# Patient Record
Sex: FEMALE | Race: ASIAN | URBAN - METROPOLITAN AREA
[De-identification: names, ages, dates, MRNs, and addresses within clinical notes are randomized per-mention and may not be internally consistent; named-entity substitution may affect disease eponyms.]

---

## 2023-11-22 ENCOUNTER — OFFICE VISIT (OUTPATIENT)
Dept: URGENT CARE | Facility: CLINIC | Age: 23
End: 2023-11-22
Payer: MEDICAID

## 2023-11-22 VITALS
SYSTOLIC BLOOD PRESSURE: 124 MMHG | OXYGEN SATURATION: 99 % | TEMPERATURE: 98 F | DIASTOLIC BLOOD PRESSURE: 88 MMHG | WEIGHT: 116 LBS | HEART RATE: 74 BPM

## 2023-11-22 DIAGNOSIS — J22 LOWER RESPIRATORY INFECTION (E.G., BRONCHITIS, PNEUMONIA, PNEUMONITIS, PULMONITIS): Primary | ICD-10-CM

## 2023-11-22 DIAGNOSIS — H66.91 ACUTE OTITIS MEDIA, RIGHT: ICD-10-CM

## 2023-11-22 PROBLEM — R51.9 INCREASED FREQUENCY OF HEADACHES: Status: ACTIVE | Noted: 2019-08-16

## 2023-11-22 PROBLEM — F41.9 ANXIETY: Status: ACTIVE | Noted: 2020-08-03

## 2023-11-22 PROBLEM — G89.29 CHRONIC ABDOMINAL PAIN: Status: ACTIVE | Noted: 2020-06-08

## 2023-11-22 PROBLEM — M94.0 COSTOCHONDRITIS: Status: ACTIVE | Noted: 2020-03-03

## 2023-11-22 PROBLEM — R10.9 CHRONIC ABDOMINAL PAIN: Status: ACTIVE | Noted: 2020-06-08

## 2023-11-22 PROBLEM — R63.0 DECREASED APPETITE: Status: ACTIVE | Noted: 2020-06-08

## 2023-11-22 PROBLEM — H52.10 MYOPIA: Status: ACTIVE | Noted: 2019-08-16

## 2023-11-22 PROBLEM — K21.9 GASTROESOPHAGEAL REFLUX DISEASE: Status: ACTIVE | Noted: 2019-05-06

## 2023-11-22 PROCEDURE — 99203 OFFICE O/P NEW LOW 30 MIN: CPT

## 2023-11-22 RX ORDER — BROMPHENIRAMINE MALEATE, PSEUDOEPHEDRINE HYDROCHLORIDE, AND DEXTROMETHORPHAN HYDROBROMIDE 2; 30; 10 MG/5ML; MG/5ML; MG/5ML
10 SYRUP ORAL 4 TIMES DAILY PRN
Qty: 120 ML | Refills: 0 | Status: SHIPPED | OUTPATIENT
Start: 2023-11-22 | End: 2023-12-02

## 2023-11-22 RX ORDER — KETOCONAZOLE 20 MG/ML
SHAMPOO, SUSPENSION TOPICAL
COMMUNITY
Start: 2021-09-28

## 2023-11-22 RX ORDER — ONDANSETRON 4 MG/1
4 TABLET, ORALLY DISINTEGRATING ORAL
COMMUNITY
Start: 2022-02-17

## 2023-11-22 RX ORDER — RIBOFLAVIN (VITAMIN B2) 100 MG
400 TABLET ORAL
COMMUNITY
Start: 2022-02-17

## 2023-11-22 RX ORDER — AMOXICILLIN AND CLAVULANATE POTASSIUM 875; 125 MG/1; MG/1
1 TABLET, FILM COATED ORAL 2 TIMES DAILY
Qty: 14 TABLET | Refills: 0 | Status: SHIPPED | OUTPATIENT
Start: 2023-11-22 | End: 2023-11-29

## 2023-11-22 RX ORDER — METHYLPREDNISOLONE 4 MG/1
TABLET ORAL
Qty: 21 TABLET | Refills: 0 | Status: SHIPPED | OUTPATIENT
Start: 2023-11-22

## 2023-11-22 RX ORDER — IBUPROFEN 600 MG/1
600 TABLET ORAL EVERY 6 HOURS PRN
COMMUNITY
Start: 2023-05-19

## 2023-11-22 ASSESSMENT — ENCOUNTER SYMPTOMS
NAUSEA: 0
SHORTNESS OF BREATH: 1
HEADACHES: 1
SORE THROAT: 1
SWEATS: 0
FEVER: 0
COUGH: 1
GASTROINTESTINAL NEGATIVE: 1
VOMITING: 0
MYALGIAS: 0
CHILLS: 0
PALPITATIONS: 0
DIARRHEA: 0
SINUS PRESSURE: 1
DIZZINESS: 0
ABDOMINAL PAIN: 0
RHINORRHEA: 1
MUSCULOSKELETAL NEGATIVE: 1
DIAPHORESIS: 0

## 2023-11-22 NOTE — PROGRESS NOTES
Subjective     Haley George is a 23 y.o. female who presents for Cough.    Patient presents with cough and sinus congestion for the last 6 days.       History provided by:  Patient and medical records  Cough  The cough is Productive of sputum. Associated symptoms include ear congestion, ear pain, headaches, nasal congestion, postnasal drip, rhinorrhea, a sore throat and shortness of breath. Pertinent negatives include no chest pain, chills, fever, myalgias, rash or sweats. The symptoms are aggravated by lying down and exercise. She has tried OTC cough suppressant and rest for the symptoms. There is no history of asthma.       /88   Pulse 74   Temp 36.7 °C (98 °F)   Wt 52.6 kg (116 lb)   LMP  (LMP Unknown)   SpO2 99%    All vitals have been reviewed and are stable.    Review of Systems   Constitutional:  Negative for chills, diaphoresis and fever.   HENT:  Positive for congestion, ear pain, postnasal drip, rhinorrhea, sinus pressure and sore throat.    Respiratory:  Positive for cough and shortness of breath.    Cardiovascular:  Negative for chest pain and palpitations.   Gastrointestinal: Negative.  Negative for abdominal pain, diarrhea, nausea and vomiting.   Musculoskeletal: Negative.  Negative for myalgias.   Skin: Negative.  Negative for rash.   Neurological:  Positive for headaches. Negative for dizziness.       Objective   Physical Exam  Vitals and nursing note reviewed.   Constitutional:       General: She is not in acute distress.     Appearance: Normal appearance. She is ill-appearing.   HENT:      Head: Normocephalic and atraumatic.      Right Ear: Ear canal and external ear normal. Tenderness present. A middle ear effusion is present. Tympanic membrane is erythematous.      Left Ear: Ear canal and external ear normal.  No middle ear effusion. Tympanic membrane is injected.      Nose: Mucosal edema, congestion and rhinorrhea present.      Mouth/Throat:      Lips: Pink.      Mouth: Mucous  membranes are moist.      Palate: No lesions.      Pharynx: Uvula midline. Oropharyngeal exudate and posterior oropharyngeal erythema present.      Tonsils: No tonsillar exudate.   Eyes:      Extraocular Movements: Extraocular movements intact.      Conjunctiva/sclera: Conjunctivae normal.      Right eye: Right conjunctiva is not injected.      Left eye: Left conjunctiva is not injected.      Pupils: Pupils are equal, round, and reactive to light.   Cardiovascular:      Rate and Rhythm: Normal rate and regular rhythm.      Heart sounds: Normal heart sounds.   Pulmonary:      Effort: Pulmonary effort is normal. No respiratory distress.      Breath sounds: Normal air entry. No stridor. Decreased breath sounds present.   Abdominal:      General: Abdomen is flat.      Palpations: Abdomen is soft.   Musculoskeletal:         General: Normal range of motion.      Cervical back: Normal range of motion and neck supple.   Lymphadenopathy:      Cervical: No cervical adenopathy.   Skin:     General: Skin is warm and dry.   Neurological:      General: No focal deficit present.      Mental Status: She is alert and oriented to person, place, and time. Mental status is at baseline.   Psychiatric:         Mood and Affect: Mood normal.         Behavior: Behavior normal.         Assessment/Plan   Problem List Items Addressed This Visit    None  Visit Diagnoses       Lower respiratory infection (e.g., bronchitis, pneumonia, pneumonitis, pulmonitis)    -  Primary    Relevant Medications    methylPREDNISolone (Medrol Dospak) 4 mg tablets    Acute otitis media, right        Relevant Medications    methylPREDNISolone (Medrol Dospak) 4 mg tablets            Red flags for reporting to ER have been reviewed with the patient.    Current diagnosis, any medication changes, and all in-office lab or radiologic results have been reviewed with the patient at the time of the visit.   If symptoms do not improve or worsen, patient is to follow up with  PCP or report to the emergency room.   Patient is alert and oriented x3 and non-toxic appearing. Vital signs are stable.   Patient and/or guardian has sufficient decision-making capabilities at this time and reports understanding and agreement with the treatment plan made through shared decision-making.

## 2023-11-22 NOTE — PATIENT INSTRUCTIONS
LOWER RESPIRATORY INFECTION      - METHYLPREDNISOLONE (steroid) has been prescribed to reduce inflammation in the airways    - Bromfed syrup has been prescribed to help symptoms as it is a nasal decongestant, cough suppressant, and antihistamine     - Mucinex (guaifenesin) recommended to help thin mucus making it easier to clear and reduce nasal congestion         - Recommend Tessalon perles and Medrol steroid during the day and Bromfed syrup at night     - Use Ibuprofen (Advil) or Acetaminophen (Tylenol) at home for headache and fever reduction if needed     - Recommended use of OTC cough and cold syrups like Dayquil/Nyquil or Mucinex containing guaifenesin (thins mucus), phenylephrine (nasal decongestant) , and/or dextromethorphan (cough suppresant) if not using Bromfed syrup RX         - Increase rest and fluids to recover sooner and loosen mucus     - May use a humidifier, cough drops, saline nasal wash (Neti pot) for symptom relief     - Avoid cigarette smoke and do not vape as this will continue to irritate airway (may use nicotine gum or patches if nicotine dependent)    - If any chest pain or difficulty breathing occurs please go to the ER  - If no improvement after 7-10 days, please follow up with PCP, if you need a referral, please call our office.     ACUTE OTITIS MEDIA     - AMOXICILLIN - CLAVULANATE ACID (Augmentin) 7 DAYS (antibiotic) has been prescribed for the treatment of infection behind the ear drum     - Ibuprofen and/or Acetaminophen may be used every 4-6 hours for pain, inflammation, and fever reduction as needed   - Hydrogen Peroxide or OTC Debrox drops may be used regularly to loosen cerumen (ear wax) to promote self cleaning    - Rubbing Alcohol and/or Distilled White Vinegar (Acetic Acid) may be used in ears after swimming to dry out water and prevent infection of the ear canal     - It is common to feel that ears still feel clogged a few days after completing treatment as fluid may remain  behind the ear drum after the infection is cleared.  If symptoms do not improve or get worse in 3-4 days follow-up with PCP as additional treatment may be required.